# Patient Record
Sex: FEMALE | Race: BLACK OR AFRICAN AMERICAN | URBAN - METROPOLITAN AREA
[De-identification: names, ages, dates, MRNs, and addresses within clinical notes are randomized per-mention and may not be internally consistent; named-entity substitution may affect disease eponyms.]

---

## 2019-05-21 ENCOUNTER — TELEPHONE (OUTPATIENT)
Dept: CARDIOLOGY CLINIC | Age: 64
End: 2019-05-21

## 2019-05-21 NOTE — TELEPHONE ENCOUNTER
Faxed records request to Dr. Mago Calero office for upcoming appointment.     Future Appointments   Date Time Provider Aspen Oden   5/24/2019 11:00 AM Irish Yanes  [address]

## 2019-05-22 PROBLEM — R55 VASOVAGAL SYNCOPE: Status: ACTIVE | Noted: 2019-05-22

## 2019-05-24 ENCOUNTER — OFFICE VISIT (OUTPATIENT)
Dept: CARDIOLOGY CLINIC | Age: 64
End: 2019-05-24

## 2019-05-24 VITALS
DIASTOLIC BLOOD PRESSURE: 84 MMHG | RESPIRATION RATE: 16 BRPM | BODY MASS INDEX: 26.66 KG/M2 | HEIGHT: 65 IN | HEART RATE: 66 BPM | OXYGEN SATURATION: 98 % | SYSTOLIC BLOOD PRESSURE: 126 MMHG | WEIGHT: 160 LBS

## 2019-05-24 DIAGNOSIS — R55 VASOVAGAL SYNCOPE: Primary | ICD-10-CM

## 2019-05-24 DIAGNOSIS — R09.89 BRUIT: ICD-10-CM

## 2019-05-24 DIAGNOSIS — R00.2 PALPITATIONS: ICD-10-CM

## 2019-05-24 RX ORDER — ALBUTEROL SULFATE 90 UG/1
2 AEROSOL, METERED RESPIRATORY (INHALATION)
COMMUNITY

## 2019-05-24 RX ORDER — ASPIRIN 81 MG/1
TABLET ORAL DAILY
COMMUNITY

## 2019-05-24 RX ORDER — CALCIUM CARBONATE 600 MG
600 TABLET ORAL 2 TIMES DAILY
COMMUNITY

## 2019-05-24 RX ORDER — BUDESONIDE AND FORMOTEROL FUMARATE DIHYDRATE 160; 4.5 UG/1; UG/1
2 AEROSOL RESPIRATORY (INHALATION)
COMMUNITY

## 2019-05-24 RX ORDER — ASCORBIC ACID 500 MG
1000 TABLET ORAL DAILY
COMMUNITY

## 2019-05-24 NOTE — PROGRESS NOTES
HISTORY OF PRESENT ILLNESS  Zeenat Cameron is a 59 y.o. female     SUMMARY:   Problem List  Date Reviewed: 5/22/2019          Codes Class Noted    Vasovagal syncope ICD-10-CM: R55  ICD-9-CM: 780.2  5/22/2019              Current Outpatient Medications on File Prior to Visit   Medication Sig    albuterol (PROAIR HFA) 90 mcg/actuation inhaler Take 2 Puffs by inhalation every six (6) hours as needed for Wheezing.  budesonide-formoterol (SYMBICORT) 160-4.5 mcg/actuation HFAA Take 2 Puffs by inhalation daily as needed.  aspirin delayed-release 81 mg tablet Take  by mouth daily.  calcium carbonate (CALTREX) 600 mg calcium (1,500 mg) tablet Take 600 mg by mouth two (2) times a day.  ascorbic acid, vitamin C, (VITAMIN C) 500 mg tablet Take 1,000 mg by mouth daily. No current facility-administered medications on file prior to visit. CARDIOLOGY STUDIES TO DATE:  No specialty comments available. 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 She has had two episodes of syncope in the last year. The first occurred after she worked night shift and went to her friend's house, got hot and clammy and fainted. Interestingly, she noted a few skipped beats around the time of this episode, but nothing sustained. On the 13th of this month, she had another episode, this one at work and again noticed just a few skipped beats around the time of the spell. There is no obvious precipitant for this. She is active at work, but otherwise gets no regular exercise. She has no history of hypertension or diabetes. She has never smoked. Cholesterol has always been good. Family history is negative for premature coronary disease. She ended up at [facility] and I have reviewed and summarized those records in the appropriate places in her chart.   She had an EKG done, which was normal sinus rhythm, normal intervals and axis and no ST-T wave changes. Her electrolytes were normal.  Her hemoglobin was 11.9 with an MCV of 72.  Apparently, she has been anemic in the past and taken iron supplements, but nothing for the last few years. She drinks very small amounts of caffeine and alcohol. She has had a long history of sleep disturbance and she has mild intermittent chronic asthma. CARDIAC ROS:   negative for chest pain, dyspnea, orthopnea, paroxysmal nocturnal dyspnea, exertional chest pressure/discomfort, claudication, lower extremity edema    Family History   Problem Relation Age of Onset    Diabetes Mother     Hypertension Mother     Diabetes Father     Hypertension Father        Past Medical History:   Diagnosis Date    Asthma        GENERAL ROS:  A comprehensive review of systems was negative except for that written in the HPI. Visit Vitals  /84 (BP 1 Location: Left arm, BP Patient Position: Sitting)   Pulse 66   Resp 16   Ht 5' 4.5\" (1.638 m)   Wt 160 lb (72.6 kg)   SpO2 98%   BMI 27.04 kg/m²       Wt Readings from Last 3 Encounters:   05/24/19 160 lb (72.6 kg)            BP Readings from Last 3 Encounters:   05/24/19 126/84       PHYSICAL EXAM  General appearance: alert, cooperative, no distress, appears stated age  Neurologic: Alert and oriented X 3  Neck: supple, symmetrical, trachea midline, no adenopathy, left carotid bruit and no JVD  Lungs: clear to auscultation bilaterally  Heart: regular rate and rhythm, S1, S2 normal, no murmur, click, rub or gallop  Abdomen: soft, non-tender. Bowel sounds normal. No masses,  no organomegaly  Extremities: extremities normal, atraumatic, no cyanosis or edema  Pulses: 2+ and symmetric      ASSESSMENT  Ms. Pinto Moh syncope sounds like it is vasovagal; however, given her palpitations and some fatigue that she has been experiencing, I think she needs an echocardiogram and an event monitor.   She also has a left carotid bruit, so we are going to do Dopplers to evaluate that as well. In terms of treatment, I just reminded her she needs to stay well hydrated, eat regularly and if she feels these symptoms coming on, she needs to sit down or lay down immediately until they pass. current treatment plan is effective, no change in therapy  lab results and schedule of future lab studies reviewed with patient  reviewed diet, exercise and weight control    Encounter Diagnoses   Name Primary?  Vasovagal syncope Yes     Orders Placed This Encounter    albuterol (PROAIR HFA) 90 mcg/actuation inhaler    budesonide-formoterol (SYMBICORT) 160-4.5 mcg/actuation HFAA    aspirin delayed-release 81 mg tablet    calcium carbonate (CALTREX) 600 mg calcium (1,500 mg) tablet    ascorbic acid, vitamin C, (VITAMIN C) 500 mg tablet       Follow-up and Dispositions    · Return in about 1 month (around 6/21/2019).          Catrachita Guerrero MD  5/24/2019

## 2019-06-10 ENCOUNTER — TELEPHONE (OUTPATIENT)
Dept: CARDIOLOGY CLINIC | Age: 64
End: 2019-06-10

## 2019-06-10 NOTE — TELEPHONE ENCOUNTER
----- Message from Soila Morgan MD sent at 6/10/2019  2:44 PM EDT -----  No significant blockages, however thyroid looks like it has some cysts on it. Copy to pcp and she should follow up with them to see if any additonal testing for thyroid is necessary.

## 2019-06-10 NOTE — TELEPHONE ENCOUNTER
----- Message from Deric Lord MD sent at 6/10/2019  2:43 PM EDT -----  Heart muscle is strong. Couple of mildly leaky valves, not a worry or cause for symptoms or concern. Looks great.

## 2019-06-10 NOTE — TELEPHONE ENCOUNTER
Called patient. Left message on voicemail requesting call back. Will notify patient of echo and carotid results. Will verify her PCP so we can send results there also.

## 2019-06-11 NOTE — TELEPHONE ENCOUNTER
Patient called. Verified patient's identity with two identifiers. Notified patient of both results. Her PCP is Dr. Say Champagne. I am sending results to PCP and she will f/u with her. Patient is wearing event monitor, but it is bothering her skin so she will return it. We will call with those results. Patient verbalized understanding and denied further questions or concerns. Test results routed to patient's PCP.

## 2019-06-21 ENCOUNTER — OFFICE VISIT (OUTPATIENT)
Dept: CARDIOLOGY CLINIC | Age: 64
End: 2019-06-21

## 2019-06-21 VITALS
RESPIRATION RATE: 14 BRPM | WEIGHT: 160 LBS | HEART RATE: 67 BPM | HEIGHT: 64 IN | SYSTOLIC BLOOD PRESSURE: 124 MMHG | OXYGEN SATURATION: 98 % | BODY MASS INDEX: 27.31 KG/M2 | DIASTOLIC BLOOD PRESSURE: 82 MMHG

## 2019-06-21 DIAGNOSIS — R09.89 BRUIT: ICD-10-CM

## 2019-06-21 DIAGNOSIS — R55 VASOVAGAL SYNCOPE: Primary | ICD-10-CM

## 2019-06-21 DIAGNOSIS — R00.2 PALPITATIONS: ICD-10-CM

## 2019-06-21 RX ORDER — LANOLIN ALCOHOL/MO/W.PET/CERES
CREAM (GRAM) TOPICAL
COMMUNITY

## 2019-06-21 NOTE — PROGRESS NOTES
HISTORY OF PRESENT ILLNESS  Rodrigue Becerra is a 62 y.o. female     SUMMARY:   Problem List  Date Reviewed: 5/22/2019          Codes Class Noted    Vasovagal syncope ICD-10-CM: R55  ICD-9-CM: 780.2  5/22/2019              Current Outpatient Medications on File Prior to Visit   Medication Sig    ferrous sulfate (IRON) 325 mg (65 mg iron) tablet Take  by mouth Daily (before breakfast).  albuterol (PROAIR HFA) 90 mcg/actuation inhaler Take 2 Puffs by inhalation every six (6) hours as needed for Wheezing.  budesonide-formoterol (SYMBICORT) 160-4.5 mcg/actuation HFAA Take 2 Puffs by inhalation daily as needed.  aspirin delayed-release 81 mg tablet Take  by mouth daily.  calcium carbonate (CALTREX) 600 mg calcium (1,500 mg) tablet Take 600 mg by mouth two (2) times a day.  ascorbic acid, vitamin C, (VITAMIN C) 500 mg tablet Take 1,000 mg by mouth daily. No current facility-administered medications on file prior to visit. CARDIOLOGY STUDIES TO DATE:  6/19 negative carotid dopplers  06/04/19   ECHO ADULT COMPLETE 06/10/2019 6/10/2019  Narrative  · Left Ventricle: Borderline hypertrophy. Calculated left ventricular ejection fraction is 65%. Biplane method used to measure ejection fraction. No regional wall motion abnormality noted. · Mitral Valve: Mild mitral valve regurgitation. · Pulmonic Valve: Mild pulmonic valve regurgitation is present. Signed by: Griselda Gonzales MD  6/19 event monitor worn for only couple days, no abnormalities    Chief Complaint   Patient presents with    Dizziness     HPI :  Since we last met, Ms. Charline Powers has had just one very, very mild episode of dizziness, which occurred shortly after she had a mammogram a week or two ago. She is not drinking enough water and she knows that and partly it is because of her work schedule and how busy she is there.   We reviewed all of her testing, which looked good and I reassured her that the mild regurgitation we saw on her echocardiogram was of no consequence. CARDIAC ROS:   negative for chest pain, dyspnea, palpitations, syncope, orthopnea, paroxysmal nocturnal dyspnea, exertional chest pressure/discomfort, claudication, lower extremity edema    Family History   Problem Relation Age of Onset    Diabetes Mother     Hypertension Mother     Diabetes Father     Hypertension Father        Past Medical History:   Diagnosis Date    Asthma        GENERAL ROS:  A comprehensive review of systems was negative except for that written in the HPI. Visit Vitals  /82 (BP 1 Location: Left arm, BP Patient Position: Sitting)   Pulse 67   Resp 14   Ht 5' 4\" (1.626 m)   Wt 160 lb (72.6 kg)   SpO2 98%   BMI 27.46 kg/m²       Wt Readings from Last 3 Encounters:   06/21/19 160 lb (72.6 kg)   06/04/19 160 lb (72.6 kg)   05/24/19 160 lb (72.6 kg)            BP Readings from Last 3 Encounters:   06/21/19 124/82   06/04/19 126/84   05/24/19 126/84       PHYSICAL EXAM  General appearance: alert, cooperative, no distress, appears stated age  Neurologic: Alert and oriented X 3  Neck: supple, symmetrical, trachea midline, no adenopathy, left carotid bruit and no JVD  Lungs: clear to auscultation bilaterally  Heart: regular rate and rhythm, S1, S2 normal, no murmur, click, rub or gallop  Extremities: extremities normal, atraumatic, no cyanosis or edema      ASSESSMENT  Ms. Sascha Stringer is stable and minimally symptomatic and at this point needs no further cardiac testing. current treatment plan is effective, no change in therapy  lab results and schedule of future lab studies reviewed with patient  reviewed diet, exercise and weight control    Encounter Diagnoses   Name Primary?  Vasovagal syncope Yes    Palpitations     Bruit      Orders Placed This Encounter    ferrous sulfate (IRON) 325 mg (65 mg iron) tablet       Follow-up and Dispositions    · Return if symptoms worsen or fail to improve.          Zan Perdomo III, MD  6/21/2019

## 2019-07-01 ENCOUNTER — TELEPHONE (OUTPATIENT)
Dept: CARDIOLOGY CLINIC | Age: 64
End: 2019-07-01

## 2019-07-02 NOTE — TELEPHONE ENCOUNTER
Called patient. Verified patient's identity with two identifiers. Notified patient of results and Dr. Zack John message. Patient verbalized understanding and denied further questions or concerns.